# Patient Record
Sex: FEMALE | Race: WHITE | NOT HISPANIC OR LATINO
[De-identification: names, ages, dates, MRNs, and addresses within clinical notes are randomized per-mention and may not be internally consistent; named-entity substitution may affect disease eponyms.]

---

## 2017-06-29 ENCOUNTER — APPOINTMENT (OUTPATIENT)
Dept: ORTHOPEDIC SURGERY | Facility: CLINIC | Age: 72
End: 2017-06-29

## 2017-06-29 VITALS
HEART RATE: 66 BPM | HEIGHT: 65 IN | SYSTOLIC BLOOD PRESSURE: 145 MMHG | WEIGHT: 218 LBS | BODY MASS INDEX: 36.32 KG/M2 | DIASTOLIC BLOOD PRESSURE: 85 MMHG

## 2017-07-03 ENCOUNTER — APPOINTMENT (OUTPATIENT)
Dept: ORTHOPEDIC SURGERY | Facility: CLINIC | Age: 72
End: 2017-07-03

## 2017-07-03 DIAGNOSIS — Z78.9 OTHER SPECIFIED HEALTH STATUS: ICD-10-CM

## 2017-07-03 DIAGNOSIS — Z87.891 PERSONAL HISTORY OF NICOTINE DEPENDENCE: ICD-10-CM

## 2017-07-03 DIAGNOSIS — Z86.39 PERSONAL HISTORY OF OTHER ENDOCRINE, NUTRITIONAL AND METABOLIC DISEASE: ICD-10-CM

## 2017-08-28 ENCOUNTER — OUTPATIENT (OUTPATIENT)
Dept: OUTPATIENT SERVICES | Facility: HOSPITAL | Age: 72
LOS: 1 days | End: 2017-08-28
Payer: COMMERCIAL

## 2017-08-28 VITALS
DIASTOLIC BLOOD PRESSURE: 78 MMHG | RESPIRATION RATE: 16 BRPM | TEMPERATURE: 98 F | WEIGHT: 223.99 LBS | HEIGHT: 65 IN | OXYGEN SATURATION: 96 % | HEART RATE: 67 BPM | SYSTOLIC BLOOD PRESSURE: 123 MMHG

## 2017-08-28 DIAGNOSIS — M19.90 UNSPECIFIED OSTEOARTHRITIS, UNSPECIFIED SITE: ICD-10-CM

## 2017-08-28 DIAGNOSIS — G47.33 OBSTRUCTIVE SLEEP APNEA (ADULT) (PEDIATRIC): ICD-10-CM

## 2017-08-28 DIAGNOSIS — I10 ESSENTIAL (PRIMARY) HYPERTENSION: ICD-10-CM

## 2017-08-28 DIAGNOSIS — M17.12 UNILATERAL PRIMARY OSTEOARTHRITIS, LEFT KNEE: ICD-10-CM

## 2017-08-28 DIAGNOSIS — Z98.890 OTHER SPECIFIED POSTPROCEDURAL STATES: Chronic | ICD-10-CM

## 2017-08-28 DIAGNOSIS — Z90.49 ACQUIRED ABSENCE OF OTHER SPECIFIED PARTS OF DIGESTIVE TRACT: Chronic | ICD-10-CM

## 2017-08-28 DIAGNOSIS — Z01.818 ENCOUNTER FOR OTHER PREPROCEDURAL EXAMINATION: ICD-10-CM

## 2017-08-28 LAB
ANION GAP SERPL CALC-SCNC: 12 MMOL/L — SIGNIFICANT CHANGE UP (ref 5–17)
BLD GP AB SCN SERPL QL: NEGATIVE — SIGNIFICANT CHANGE UP
BUN SERPL-MCNC: 23 MG/DL — SIGNIFICANT CHANGE UP (ref 7–23)
CALCIUM SERPL-MCNC: 10.1 MG/DL — SIGNIFICANT CHANGE UP (ref 8.4–10.5)
CHLORIDE SERPL-SCNC: 105 MMOL/L — SIGNIFICANT CHANGE UP (ref 96–108)
CO2 SERPL-SCNC: 26 MMOL/L — SIGNIFICANT CHANGE UP (ref 22–31)
CREAT SERPL-MCNC: 0.74 MG/DL — SIGNIFICANT CHANGE UP (ref 0.5–1.3)
GLUCOSE SERPL-MCNC: 74 MG/DL — SIGNIFICANT CHANGE UP (ref 70–99)
HCT VFR BLD CALC: 43.2 % — SIGNIFICANT CHANGE UP (ref 34.5–45)
HGB BLD-MCNC: 14 G/DL — SIGNIFICANT CHANGE UP (ref 11.5–15.5)
MCHC RBC-ENTMCNC: 30.9 PG — SIGNIFICANT CHANGE UP (ref 27–34)
MCHC RBC-ENTMCNC: 32.4 GM/DL — SIGNIFICANT CHANGE UP (ref 32–36)
MCV RBC AUTO: 95.4 FL — SIGNIFICANT CHANGE UP (ref 80–100)
MRSA PCR RESULT.: SIGNIFICANT CHANGE UP
PLATELET # BLD AUTO: 323 K/UL — SIGNIFICANT CHANGE UP (ref 150–400)
POTASSIUM SERPL-MCNC: 4.3 MMOL/L — SIGNIFICANT CHANGE UP (ref 3.5–5.3)
POTASSIUM SERPL-SCNC: 4.3 MMOL/L — SIGNIFICANT CHANGE UP (ref 3.5–5.3)
RBC # BLD: 4.53 M/UL — SIGNIFICANT CHANGE UP (ref 3.8–5.2)
RBC # FLD: 13.5 % — SIGNIFICANT CHANGE UP (ref 10.3–14.5)
RH IG SCN BLD-IMP: POSITIVE — SIGNIFICANT CHANGE UP
S AUREUS DNA NOSE QL NAA+PROBE: SIGNIFICANT CHANGE UP
SODIUM SERPL-SCNC: 143 MMOL/L — SIGNIFICANT CHANGE UP (ref 135–145)
WBC # BLD: 7.14 K/UL — SIGNIFICANT CHANGE UP (ref 3.8–10.5)
WBC # FLD AUTO: 7.14 K/UL — SIGNIFICANT CHANGE UP (ref 3.8–10.5)

## 2017-08-28 PROCEDURE — 93010 ELECTROCARDIOGRAM REPORT: CPT

## 2017-08-28 PROCEDURE — 86901 BLOOD TYPING SEROLOGIC RH(D): CPT

## 2017-08-28 PROCEDURE — G0463: CPT

## 2017-08-28 PROCEDURE — 80048 BASIC METABOLIC PNL TOTAL CA: CPT

## 2017-08-28 PROCEDURE — 86850 RBC ANTIBODY SCREEN: CPT

## 2017-08-28 PROCEDURE — 87641 MR-STAPH DNA AMP PROBE: CPT

## 2017-08-28 PROCEDURE — 93005 ELECTROCARDIOGRAM TRACING: CPT

## 2017-08-28 PROCEDURE — 85027 COMPLETE CBC AUTOMATED: CPT

## 2017-08-28 PROCEDURE — 87640 STAPH A DNA AMP PROBE: CPT

## 2017-08-28 PROCEDURE — 86900 BLOOD TYPING SEROLOGIC ABO: CPT

## 2017-08-28 RX ORDER — CEFAZOLIN SODIUM 1 G
2000 VIAL (EA) INJECTION ONCE
Qty: 0 | Refills: 0 | Status: DISCONTINUED | OUTPATIENT
Start: 2017-09-11 | End: 2017-09-13

## 2017-08-28 RX ORDER — GABAPENTIN 400 MG/1
100 CAPSULE ORAL ONCE
Qty: 0 | Refills: 0 | Status: COMPLETED | OUTPATIENT
Start: 2017-09-11 | End: 2017-09-11

## 2017-08-28 RX ORDER — TRAMADOL HYDROCHLORIDE 50 MG/1
50 TABLET ORAL ONCE
Qty: 0 | Refills: 0 | Status: DISCONTINUED | OUTPATIENT
Start: 2017-09-11 | End: 2017-09-11

## 2017-08-28 RX ORDER — PANTOPRAZOLE SODIUM 20 MG/1
40 TABLET, DELAYED RELEASE ORAL ONCE
Qty: 0 | Refills: 0 | Status: COMPLETED | OUTPATIENT
Start: 2017-09-11 | End: 2017-09-11

## 2017-08-28 RX ORDER — ACETAMINOPHEN 500 MG
975 TABLET ORAL ONCE
Qty: 0 | Refills: 0 | Status: COMPLETED | OUTPATIENT
Start: 2017-09-11 | End: 2017-09-11

## 2017-08-28 NOTE — H&P PST ADULT - PMH
HTN (hypertension)    Hyperlipemia    OA (osteoarthritis)  left knee  Obesity  BMI 37.3 on 8/28/2017  WILMER (obstructive sleep apnea)  as per [facility] criteria

## 2017-08-28 NOTE — H&P PST ADULT - PSH
Basal cell carcinoma of face  Mohs surgery  2/23/12  S/P breast biopsy  right, benign  S/P laparoscopic cholecystectomy  2002  Uterus bleeding  D&C

## 2017-08-28 NOTE — H&P PST ADULT - HISTORY OF PRESENT ILLNESS
72 yo female, PMH HTN, HLD, Obesity, reports left knee pain for many years, becoming progressively worse, pt. presents to PST today for Left Total Knee Replacement on 9/11/17. Pt. had a viral cold about 2 weeks ago, denies any further nasal symptoms, no cough, no fever, chills, chest pain, SOB, changes in bowel/urinary habits.

## 2017-08-28 NOTE — H&P PST ADULT - PROBLEM SELECTOR PLAN 1
Left total knee replacement  Pre-op education, including Chlorhexidine soap, provided - all questions answered

## 2017-08-28 NOTE — H&P PST ADULT - NSANTHOSAYNRD_GEN_A_CORE
No. WILMER screening performed.  STOP BANG Legend: 0-2 = LOW Risk; 3-4 = INTERMEDIATE Risk; 5-8 = HIGH Risk

## 2017-09-01 ENCOUNTER — TRANSCRIPTION ENCOUNTER (OUTPATIENT)
Age: 72
End: 2017-09-01

## 2017-09-11 ENCOUNTER — INPATIENT (INPATIENT)
Facility: HOSPITAL | Age: 72
LOS: 1 days | Discharge: HOME | DRG: 470 | End: 2017-09-13
Attending: ORTHOPAEDIC SURGERY | Admitting: ORTHOPAEDIC SURGERY
Payer: COMMERCIAL

## 2017-09-11 ENCOUNTER — TRANSCRIPTION ENCOUNTER (OUTPATIENT)
Age: 72
End: 2017-09-11

## 2017-09-11 ENCOUNTER — APPOINTMENT (OUTPATIENT)
Dept: ORTHOPEDIC SURGERY | Facility: HOSPITAL | Age: 72
End: 2017-09-11

## 2017-09-11 ENCOUNTER — RESULT REVIEW (OUTPATIENT)
Age: 72
End: 2017-09-11

## 2017-09-11 VITALS
HEART RATE: 66 BPM | HEIGHT: 65 IN | DIASTOLIC BLOOD PRESSURE: 86 MMHG | OXYGEN SATURATION: 98 % | RESPIRATION RATE: 20 BRPM | WEIGHT: 223.99 LBS | SYSTOLIC BLOOD PRESSURE: 126 MMHG | TEMPERATURE: 98 F

## 2017-09-11 DIAGNOSIS — Z98.890 OTHER SPECIFIED POSTPROCEDURAL STATES: Chronic | ICD-10-CM

## 2017-09-11 DIAGNOSIS — M17.12 UNILATERAL PRIMARY OSTEOARTHRITIS, LEFT KNEE: ICD-10-CM

## 2017-09-11 DIAGNOSIS — Z90.49 ACQUIRED ABSENCE OF OTHER SPECIFIED PARTS OF DIGESTIVE TRACT: Chronic | ICD-10-CM

## 2017-09-11 LAB
ANION GAP SERPL CALC-SCNC: 13 MMOL/L — SIGNIFICANT CHANGE UP (ref 5–17)
BUN SERPL-MCNC: 16 MG/DL — SIGNIFICANT CHANGE UP (ref 7–23)
CALCIUM SERPL-MCNC: 8.2 MG/DL — LOW (ref 8.4–10.5)
CHLORIDE SERPL-SCNC: 105 MMOL/L — SIGNIFICANT CHANGE UP (ref 96–108)
CO2 SERPL-SCNC: 24 MMOL/L — SIGNIFICANT CHANGE UP (ref 22–31)
CREAT SERPL-MCNC: 0.61 MG/DL — SIGNIFICANT CHANGE UP (ref 0.5–1.3)
GLUCOSE SERPL-MCNC: 129 MG/DL — HIGH (ref 70–99)
HCT VFR BLD CALC: 40.3 % — SIGNIFICANT CHANGE UP (ref 34.5–45)
HGB BLD-MCNC: 13.7 G/DL — SIGNIFICANT CHANGE UP (ref 11.5–15.5)
MCHC RBC-ENTMCNC: 33.5 PG — SIGNIFICANT CHANGE UP (ref 27–34)
MCHC RBC-ENTMCNC: 34.1 GM/DL — SIGNIFICANT CHANGE UP (ref 32–36)
MCV RBC AUTO: 98.3 FL — SIGNIFICANT CHANGE UP (ref 80–100)
PLATELET # BLD AUTO: 251 K/UL — SIGNIFICANT CHANGE UP (ref 150–400)
POTASSIUM SERPL-MCNC: 4.2 MMOL/L — SIGNIFICANT CHANGE UP (ref 3.5–5.3)
POTASSIUM SERPL-SCNC: 4.2 MMOL/L — SIGNIFICANT CHANGE UP (ref 3.5–5.3)
RBC # BLD: 4.1 M/UL — SIGNIFICANT CHANGE UP (ref 3.8–5.2)
RBC # FLD: 12.1 % — SIGNIFICANT CHANGE UP (ref 10.3–14.5)
SODIUM SERPL-SCNC: 142 MMOL/L — SIGNIFICANT CHANGE UP (ref 135–145)
WBC # BLD: 12.4 K/UL — HIGH (ref 3.8–10.5)
WBC # FLD AUTO: 12.4 K/UL — HIGH (ref 3.8–10.5)

## 2017-09-11 PROCEDURE — 88311 DECALCIFY TISSUE: CPT | Mod: 26

## 2017-09-11 PROCEDURE — 73560 X-RAY EXAM OF KNEE 1 OR 2: CPT | Mod: 26,LT

## 2017-09-11 PROCEDURE — 88305 TISSUE EXAM BY PATHOLOGIST: CPT | Mod: 26

## 2017-09-11 PROCEDURE — 27447 TOTAL KNEE ARTHROPLASTY: CPT | Mod: LT

## 2017-09-11 RX ORDER — DOCUSATE SODIUM 100 MG
100 CAPSULE ORAL THREE TIMES A DAY
Qty: 0 | Refills: 0 | Status: DISCONTINUED | OUTPATIENT
Start: 2017-09-11 | End: 2017-09-13

## 2017-09-11 RX ORDER — SODIUM CHLORIDE 9 MG/ML
1000 INJECTION, SOLUTION INTRAVENOUS
Qty: 0 | Refills: 0 | Status: DISCONTINUED | OUTPATIENT
Start: 2017-09-11 | End: 2017-09-12

## 2017-09-11 RX ORDER — PANTOPRAZOLE SODIUM 20 MG/1
40 TABLET, DELAYED RELEASE ORAL DAILY
Qty: 0 | Refills: 0 | Status: DISCONTINUED | OUTPATIENT
Start: 2017-09-11 | End: 2017-09-13

## 2017-09-11 RX ORDER — OXYCODONE HYDROCHLORIDE 5 MG/1
5 TABLET ORAL EVERY 4 HOURS
Qty: 0 | Refills: 0 | Status: DISCONTINUED | OUTPATIENT
Start: 2017-09-11 | End: 2017-09-13

## 2017-09-11 RX ORDER — ACETAMINOPHEN 500 MG
650 TABLET ORAL EVERY 6 HOURS
Qty: 0 | Refills: 0 | Status: DISCONTINUED | OUTPATIENT
Start: 2017-09-11 | End: 2017-09-13

## 2017-09-11 RX ORDER — ONDANSETRON 8 MG/1
4 TABLET, FILM COATED ORAL ONCE
Qty: 0 | Refills: 0 | Status: COMPLETED | OUTPATIENT
Start: 2017-09-11 | End: 2017-09-11

## 2017-09-11 RX ORDER — VALSARTAN 80 MG/1
80 TABLET ORAL DAILY
Qty: 0 | Refills: 0 | Status: DISCONTINUED | OUTPATIENT
Start: 2017-09-11 | End: 2017-09-13

## 2017-09-11 RX ORDER — DIPHENHYDRAMINE HCL 50 MG
25 CAPSULE ORAL AT BEDTIME
Qty: 0 | Refills: 0 | Status: DISCONTINUED | OUTPATIENT
Start: 2017-09-11 | End: 2017-09-13

## 2017-09-11 RX ORDER — ATORVASTATIN CALCIUM 80 MG/1
20 TABLET, FILM COATED ORAL AT BEDTIME
Qty: 0 | Refills: 0 | Status: DISCONTINUED | OUTPATIENT
Start: 2017-09-11 | End: 2017-09-13

## 2017-09-11 RX ORDER — TRAMADOL HYDROCHLORIDE 50 MG/1
50 TABLET ORAL EVERY 8 HOURS
Qty: 0 | Refills: 0 | Status: DISCONTINUED | OUTPATIENT
Start: 2017-09-11 | End: 2017-09-13

## 2017-09-11 RX ORDER — OXYCODONE HYDROCHLORIDE 5 MG/1
10 TABLET ORAL EVERY 4 HOURS
Qty: 0 | Refills: 0 | Status: DISCONTINUED | OUTPATIENT
Start: 2017-09-11 | End: 2017-09-13

## 2017-09-11 RX ORDER — ONDANSETRON 8 MG/1
4 TABLET, FILM COATED ORAL EVERY 6 HOURS
Qty: 0 | Refills: 0 | Status: DISCONTINUED | OUTPATIENT
Start: 2017-09-11 | End: 2017-09-13

## 2017-09-11 RX ORDER — SENNA PLUS 8.6 MG/1
2 TABLET ORAL AT BEDTIME
Qty: 0 | Refills: 0 | Status: DISCONTINUED | OUTPATIENT
Start: 2017-09-11 | End: 2017-09-13

## 2017-09-11 RX ORDER — HYDROMORPHONE HYDROCHLORIDE 2 MG/ML
0.25 INJECTION INTRAMUSCULAR; INTRAVENOUS; SUBCUTANEOUS
Qty: 0 | Refills: 0 | Status: DISCONTINUED | OUTPATIENT
Start: 2017-09-11 | End: 2017-09-11

## 2017-09-11 RX ORDER — SODIUM CHLORIDE 9 MG/ML
3 INJECTION INTRAMUSCULAR; INTRAVENOUS; SUBCUTANEOUS EVERY 8 HOURS
Qty: 0 | Refills: 0 | Status: DISCONTINUED | OUTPATIENT
Start: 2017-09-11 | End: 2017-09-11

## 2017-09-11 RX ORDER — ASPIRIN/CALCIUM CARB/MAGNESIUM 324 MG
325 TABLET ORAL
Qty: 0 | Refills: 0 | Status: DISCONTINUED | OUTPATIENT
Start: 2017-09-11 | End: 2017-09-13

## 2017-09-11 RX ORDER — POLYETHYLENE GLYCOL 3350 17 G/17G
17 POWDER, FOR SOLUTION ORAL DAILY
Qty: 0 | Refills: 0 | Status: DISCONTINUED | OUTPATIENT
Start: 2017-09-11 | End: 2017-09-13

## 2017-09-11 RX ORDER — ATORVASTATIN CALCIUM 80 MG/1
1 TABLET, FILM COATED ORAL
Qty: 0 | Refills: 0 | COMMUNITY

## 2017-09-11 RX ORDER — CEFAZOLIN SODIUM 1 G
2000 VIAL (EA) INJECTION EVERY 8 HOURS
Qty: 0 | Refills: 0 | Status: COMPLETED | OUTPATIENT
Start: 2017-09-11 | End: 2017-09-12

## 2017-09-11 RX ORDER — VALSARTAN 80 MG/1
1 TABLET ORAL
Qty: 0 | Refills: 0 | COMMUNITY

## 2017-09-11 RX ORDER — MAGNESIUM HYDROXIDE 400 MG/1
30 TABLET, CHEWABLE ORAL DAILY
Qty: 0 | Refills: 0 | Status: DISCONTINUED | OUTPATIENT
Start: 2017-09-11 | End: 2017-09-13

## 2017-09-11 RX ADMIN — Medication 975 MILLIGRAM(S): at 09:32

## 2017-09-11 RX ADMIN — SODIUM CHLORIDE 3 MILLILITER(S): 9 INJECTION INTRAMUSCULAR; INTRAVENOUS; SUBCUTANEOUS at 09:38

## 2017-09-11 RX ADMIN — TRAMADOL HYDROCHLORIDE 50 MILLIGRAM(S): 50 TABLET ORAL at 21:20

## 2017-09-11 RX ADMIN — GABAPENTIN 100 MILLIGRAM(S): 400 CAPSULE ORAL at 09:31

## 2017-09-11 RX ADMIN — Medication 100 MILLIGRAM(S): at 18:39

## 2017-09-11 RX ADMIN — ONDANSETRON 4 MILLIGRAM(S): 8 TABLET, FILM COATED ORAL at 14:39

## 2017-09-11 RX ADMIN — ONDANSETRON 4 MILLIGRAM(S): 8 TABLET, FILM COATED ORAL at 18:39

## 2017-09-11 RX ADMIN — TRAMADOL HYDROCHLORIDE 50 MILLIGRAM(S): 50 TABLET ORAL at 09:31

## 2017-09-11 RX ADMIN — Medication 325 MILLIGRAM(S): at 17:27

## 2017-09-11 RX ADMIN — SODIUM CHLORIDE 75 MILLILITER(S): 9 INJECTION, SOLUTION INTRAVENOUS at 16:56

## 2017-09-11 RX ADMIN — ATORVASTATIN CALCIUM 20 MILLIGRAM(S): 80 TABLET, FILM COATED ORAL at 21:20

## 2017-09-11 RX ADMIN — TRAMADOL HYDROCHLORIDE 50 MILLIGRAM(S): 50 TABLET ORAL at 22:22

## 2017-09-11 RX ADMIN — Medication 100 MILLIGRAM(S): at 21:20

## 2017-09-11 RX ADMIN — PANTOPRAZOLE SODIUM 40 MILLIGRAM(S): 20 TABLET, DELAYED RELEASE ORAL at 09:32

## 2017-09-11 RX ADMIN — VALSARTAN 80 MILLIGRAM(S): 80 TABLET ORAL at 21:20

## 2017-09-11 NOTE — PATIENT PROFILE ADULT. - PMH
HTN (hypertension)    Hyperlipemia    OA (osteoarthritis)  left knee  Obesity  BMI 37.3 on 8/28/2017  WILMER (obstructive sleep apnea)  as per [facility] criteria

## 2017-09-11 NOTE — PHYSICAL THERAPY INITIAL EVALUATION ADULT - ADDITIONAL COMMENTS
Pt lives in private house with  and son. Has 4 steps to enter bilateral rails but width allows only one rail at a time. Has full flight to upstairs bedroom, but can stay on first floor if needed.

## 2017-09-11 NOTE — PHYSICAL THERAPY INITIAL EVALUATION ADULT - ACTIVE RANGE OF MOTION EXAMINATION, REHAB EVAL
Right LE Active ROM was WFL (within functional limits)/bilateral upper extremity Active ROM was WFL (within functional limits)/left knee limited s/p TKR

## 2017-09-11 NOTE — CHART NOTE - NSCHARTNOTEFT_GEN_A_CORE
Patient is a 72y old  Female who presents with a chief complaint of "left knee replacement" (11 Sep 2017 09:48)      Resting without complaints.  No Chest Pain, SOB, N/V.    T(C): 36 (09-11-17 @ 14:30), Max: 36.8 (09-11-17 @ 08:50)  HR: 69 (09-11-17 @ 15:00) (65 - 72)  BP: 143/75 (09-11-17 @ 14:45) (126/86 - 170/78)  RR: 20 (09-11-17 @ 15:00) (15 - 28)  SpO2: 98% (09-11-17 @ 15:00) (97% - 100%)  Wt(kg): --    Exam:   Alert and Oriented; No Acute Distress  Card: +S1/S2, RRR  Pulm: CTAB  LLE: Aquacell dressing C/D/I. Sensation grossly intact to light touch.  Calves soft, non-tender bilaterally  (+) PF/DF  (+) Distal pulses    Xray: Good alignment of hardware                          13.7   12.4  )-----------( 251      ( 11 Sep 2017 13:59 )             40.3    09-11    142  |  105  |  16  ----------------------------<  129<H>  4.2   |  24  |  0.61    Ca    8.2<L>      11 Sep 2017 13:59          Patient is a 72y old  Female who presents with a chief complaint of "left knee replacement" (11 Sep 2017 09:48)   s/p Left TKR. Pt stable.    Plan:  - Pain Control   - DVT ppx  - WBAT/OOB  - Venodynes/IS  - Continue current tx    Kike Mckeon PA-C  Orthopedic Surgery  [id number]  [id number]

## 2017-09-11 NOTE — PHYSICAL THERAPY INITIAL EVALUATION ADULT - GENERAL OBSERVATIONS, REHAB EVAL
Pt seen for PT eval s/p TKR. Pt A&Ox4, +IVL, +ACE wrap, tolerated 40 min. Pt initally nauseous, +vomiting, pt then felt much better and agreeable/eager for OOB activity. Pt w/ fair quad set and no lag noted with Active assisted SLR. Orthostatics monitored t/o session (see flowsheet for details).

## 2017-09-11 NOTE — PHYSICAL THERAPY INITIAL EVALUATION ADULT - PERTINENT HX OF CURRENT PROBLEM, REHAB EVAL
72 yo female, PMH HTN, HLD, Obesity, reports left knee pain for many years, becoming progressively worse, pt. presents to PST today for Left Total Knee Replacement on 9/11/17. Pt. had a viral cold about 2 weeks ago, denies any further nasal symptoms, no cough, no fever, chills, chest pain, SOB, changes in bowel/urinary habits. Underwent Left TKR as scheduled on 9/11/17.

## 2017-09-11 NOTE — PHYSICAL THERAPY INITIAL EVALUATION ADULT - DISCHARGE DISPOSITION, PT EVAL
home w/ home PT/family assist as needed; Home with home PT services for safety assessment within home environment and to increase strength, balance, and endurance to improve all functional mobility./home w/ assist

## 2017-09-11 NOTE — PHYSICAL THERAPY INITIAL EVALUATION ADULT - MANUAL MUSCLE TESTING RESULTS, REHAB EVAL
grossly 4+/5 bilateral UE; RLE 5/5; Left hip and ankle 5/5; left knee fair+quad set, no lag noted with Active assist SLR

## 2017-09-11 NOTE — PHYSICAL THERAPY INITIAL EVALUATION ADULT - DIAGNOSIS, PT EVAL
Pt s/p TKR with post-op pain, decreased strength, ROM endurance and balance leading to impairment in functional mobility.

## 2017-09-12 LAB
ANION GAP SERPL CALC-SCNC: 14 MMOL/L — SIGNIFICANT CHANGE UP (ref 5–17)
BUN SERPL-MCNC: 11 MG/DL — SIGNIFICANT CHANGE UP (ref 7–23)
CALCIUM SERPL-MCNC: 9.3 MG/DL — SIGNIFICANT CHANGE UP (ref 8.4–10.5)
CHLORIDE SERPL-SCNC: 101 MMOL/L — SIGNIFICANT CHANGE UP (ref 96–108)
CO2 SERPL-SCNC: 23 MMOL/L — SIGNIFICANT CHANGE UP (ref 22–31)
CREAT SERPL-MCNC: 0.49 MG/DL — LOW (ref 0.5–1.3)
GLUCOSE SERPL-MCNC: 133 MG/DL — HIGH (ref 70–99)
HCT VFR BLD CALC: 36.3 % — SIGNIFICANT CHANGE UP (ref 34.5–45)
HGB BLD-MCNC: 11.8 G/DL — SIGNIFICANT CHANGE UP (ref 11.5–15.5)
MCHC RBC-ENTMCNC: 30.4 PG — SIGNIFICANT CHANGE UP (ref 27–34)
MCHC RBC-ENTMCNC: 32.5 GM/DL — SIGNIFICANT CHANGE UP (ref 32–36)
MCV RBC AUTO: 93.6 FL — SIGNIFICANT CHANGE UP (ref 80–100)
PLATELET # BLD AUTO: 238 K/UL — SIGNIFICANT CHANGE UP (ref 150–400)
POTASSIUM SERPL-MCNC: 4.2 MMOL/L — SIGNIFICANT CHANGE UP (ref 3.5–5.3)
POTASSIUM SERPL-SCNC: 4.2 MMOL/L — SIGNIFICANT CHANGE UP (ref 3.5–5.3)
RBC # BLD: 3.88 M/UL — SIGNIFICANT CHANGE UP (ref 3.8–5.2)
RBC # FLD: 13.6 % — SIGNIFICANT CHANGE UP (ref 10.3–14.5)
SODIUM SERPL-SCNC: 138 MMOL/L — SIGNIFICANT CHANGE UP (ref 135–145)
WBC # BLD: 10.57 K/UL — HIGH (ref 3.8–10.5)
WBC # FLD AUTO: 10.57 K/UL — HIGH (ref 3.8–10.5)

## 2017-09-12 RX ORDER — SODIUM CHLORIDE 9 MG/ML
1000 INJECTION, SOLUTION INTRAVENOUS ONCE
Qty: 0 | Refills: 0 | Status: COMPLETED | OUTPATIENT
Start: 2017-09-12 | End: 2017-09-12

## 2017-09-12 RX ORDER — SODIUM CHLORIDE 9 MG/ML
1000 INJECTION, SOLUTION INTRAVENOUS
Qty: 0 | Refills: 0 | Status: DISCONTINUED | OUTPATIENT
Start: 2017-09-12 | End: 2017-09-13

## 2017-09-12 RX ORDER — METOCLOPRAMIDE HCL 10 MG
10 TABLET ORAL EVERY 8 HOURS
Qty: 0 | Refills: 0 | Status: DISCONTINUED | OUTPATIENT
Start: 2017-09-12 | End: 2017-09-13

## 2017-09-12 RX ADMIN — Medication 100 MILLIGRAM(S): at 05:33

## 2017-09-12 RX ADMIN — Medication 325 MILLIGRAM(S): at 05:31

## 2017-09-12 RX ADMIN — Medication 100 MILLIGRAM(S): at 15:11

## 2017-09-12 RX ADMIN — TRAMADOL HYDROCHLORIDE 50 MILLIGRAM(S): 50 TABLET ORAL at 07:19

## 2017-09-12 RX ADMIN — ONDANSETRON 4 MILLIGRAM(S): 8 TABLET, FILM COATED ORAL at 10:34

## 2017-09-12 RX ADMIN — OXYCODONE HYDROCHLORIDE 10 MILLIGRAM(S): 5 TABLET ORAL at 21:09

## 2017-09-12 RX ADMIN — VALSARTAN 80 MILLIGRAM(S): 80 TABLET ORAL at 21:10

## 2017-09-12 RX ADMIN — OXYCODONE HYDROCHLORIDE 10 MILLIGRAM(S): 5 TABLET ORAL at 16:49

## 2017-09-12 RX ADMIN — OXYCODONE HYDROCHLORIDE 10 MILLIGRAM(S): 5 TABLET ORAL at 09:27

## 2017-09-12 RX ADMIN — TRAMADOL HYDROCHLORIDE 50 MILLIGRAM(S): 50 TABLET ORAL at 23:13

## 2017-09-12 RX ADMIN — TRAMADOL HYDROCHLORIDE 50 MILLIGRAM(S): 50 TABLET ORAL at 15:11

## 2017-09-12 RX ADMIN — SODIUM CHLORIDE 1000 MILLILITER(S): 9 INJECTION, SOLUTION INTRAVENOUS at 11:55

## 2017-09-12 RX ADMIN — Medication 100 MILLIGRAM(S): at 21:10

## 2017-09-12 RX ADMIN — ATORVASTATIN CALCIUM 20 MILLIGRAM(S): 80 TABLET, FILM COATED ORAL at 21:10

## 2017-09-12 RX ADMIN — Medication 100 MILLIGRAM(S): at 05:31

## 2017-09-12 RX ADMIN — OXYCODONE HYDROCHLORIDE 10 MILLIGRAM(S): 5 TABLET ORAL at 08:57

## 2017-09-12 RX ADMIN — Medication 325 MILLIGRAM(S): at 18:10

## 2017-09-12 RX ADMIN — OXYCODONE HYDROCHLORIDE 10 MILLIGRAM(S): 5 TABLET ORAL at 21:39

## 2017-09-12 RX ADMIN — POLYETHYLENE GLYCOL 3350 17 GRAM(S): 17 POWDER, FOR SOLUTION ORAL at 11:56

## 2017-09-12 RX ADMIN — TRAMADOL HYDROCHLORIDE 50 MILLIGRAM(S): 50 TABLET ORAL at 05:31

## 2017-09-12 RX ADMIN — OXYCODONE HYDROCHLORIDE 10 MILLIGRAM(S): 5 TABLET ORAL at 17:19

## 2017-09-12 RX ADMIN — TRAMADOL HYDROCHLORIDE 50 MILLIGRAM(S): 50 TABLET ORAL at 22:43

## 2017-09-12 RX ADMIN — PANTOPRAZOLE SODIUM 40 MILLIGRAM(S): 20 TABLET, DELAYED RELEASE ORAL at 11:56

## 2017-09-12 RX ADMIN — SODIUM CHLORIDE 100 MILLILITER(S): 9 INJECTION, SOLUTION INTRAVENOUS at 15:11

## 2017-09-12 RX ADMIN — TRAMADOL HYDROCHLORIDE 50 MILLIGRAM(S): 50 TABLET ORAL at 15:41

## 2017-09-12 NOTE — OCCUPATIONAL THERAPY INITIAL EVALUATION ADULT - LIVES WITH, PROFILE
Pt lives with her  in a private home, 4 steps to enter with handrail. Pt will be staying on the 1st floor upon discharge with a walk in shower with small ledge (pt reports 4-6 inches) with grab bars. Pt was independent with ADLs, IADLs, functional mobility, driving prior to admission./spouse

## 2017-09-12 NOTE — OCCUPATIONAL THERAPY INITIAL EVALUATION ADULT - ANTICIPATED DISCHARGE DISPOSITION, OT EVAL
Anticipate home with home OT pending completion of functional evaluation. Supervision/assist for functional activities as needed./home w/ OT

## 2017-09-12 NOTE — OCCUPATIONAL THERAPY INITIAL EVALUATION ADULT - ADDITIONAL COMMENTS
X-Ray L Knee 9/11/17: Status post left total knee replacement without evidence of acute hardware complication.

## 2017-09-12 NOTE — PROGRESS NOTE ADULT - SUBJECTIVE AND OBJECTIVE BOX
Patient is a 72y old  Female who presents with a chief complaint of "left knee replacement" (11 Sep 2017 09:48)      POST OPERATIVE DAY #:  [1 ]   Patient comfortable. No events overnight  No complaints    T(C): 36.6 (09-12-17 @ 04:46), Max: 36.8 (09-11-17 @ 08:50)  HR: 71 (09-12-17 @ 04:46) (65 - 75)  BP: 107/58 (09-12-17 @ 04:46) (107/58 - 170/78)  RR: 18 (09-12-17 @ 04:46) (15 - 41)  SpO2: 96% (09-12-17 @ 04:46) (95% - 100%)        PHYSICAL EXAM:  NAD, Alert  Chest: CTA No W/R/R  Cor: reg S1 S2  ABD: quiet B/S; N/D  N/T  EXT: LLE  Dc'd Ace Dsg  [x ] Aquacel Dressing C/D/I  Calves soft Homans (-)  (+) DF/PF;  EHL FHL: 5/5  No gross Sensation deficits noted   (2+) Distal Pulses;   B/L, PAS     LABS:                        13.7   12.4<H> )-----------( 251      ( 11 Sep 2017 13:59 )             40.3     09-11    142  |  105  |  16  ----------------------------<  129<H>  4.2   |  24  |  0.61        RADIOLOGY & ADDITIONAL TESTS:    IMPRESSION:  Status post left total knee replacement without evidence of acute   hardware complication.

## 2017-09-12 NOTE — OCCUPATIONAL THERAPY INITIAL EVALUATION ADULT - DIAGNOSIS, OT EVAL
Pt with impaired ROM, strength, balance impacting pt's ability to complete ADLs, IADLS, functional mobility, drive.

## 2017-09-12 NOTE — PROGRESS NOTE ADULT - ASSESSMENT
Assessment: s/p L TKA  POD #1    Plan:   ck labs: cbc / sma-7  PT: WBAT  DVT proph: Ecotrin 325 mg PO BID / PAS  Pain Rx:  Oxycodone 5/10 mg Q 4 hrs prn

## 2017-09-12 NOTE — OCCUPATIONAL THERAPY INITIAL EVALUATION ADULT - PLANNED THERAPY INTERVENTIONS, OT EVAL
balance training/ROM/IADL retraining/ADL retraining/bed mobility training/strengthening/transfer training

## 2017-09-13 ENCOUNTER — TRANSCRIPTION ENCOUNTER (OUTPATIENT)
Age: 72
End: 2017-09-13

## 2017-09-13 VITALS
SYSTOLIC BLOOD PRESSURE: 118 MMHG | DIASTOLIC BLOOD PRESSURE: 62 MMHG | TEMPERATURE: 98 F | OXYGEN SATURATION: 97 % | HEART RATE: 78 BPM | RESPIRATION RATE: 18 BRPM

## 2017-09-13 LAB
ANION GAP SERPL CALC-SCNC: 14 MMOL/L — SIGNIFICANT CHANGE UP (ref 5–17)
BUN SERPL-MCNC: 13 MG/DL — SIGNIFICANT CHANGE UP (ref 7–23)
CALCIUM SERPL-MCNC: 9 MG/DL — SIGNIFICANT CHANGE UP (ref 8.4–10.5)
CHLORIDE SERPL-SCNC: 100 MMOL/L — SIGNIFICANT CHANGE UP (ref 96–108)
CO2 SERPL-SCNC: 24 MMOL/L — SIGNIFICANT CHANGE UP (ref 22–31)
CREAT SERPL-MCNC: 0.6 MG/DL — SIGNIFICANT CHANGE UP (ref 0.5–1.3)
GLUCOSE SERPL-MCNC: 105 MG/DL — HIGH (ref 70–99)
HCT VFR BLD CALC: 31.8 % — LOW (ref 34.5–45)
HGB BLD-MCNC: 10.3 G/DL — LOW (ref 11.5–15.5)
MCHC RBC-ENTMCNC: 30.1 PG — SIGNIFICANT CHANGE UP (ref 27–34)
MCHC RBC-ENTMCNC: 32.4 GM/DL — SIGNIFICANT CHANGE UP (ref 32–36)
MCV RBC AUTO: 93 FL — SIGNIFICANT CHANGE UP (ref 80–100)
PLATELET # BLD AUTO: 224 K/UL — SIGNIFICANT CHANGE UP (ref 150–400)
POTASSIUM SERPL-MCNC: 4.2 MMOL/L — SIGNIFICANT CHANGE UP (ref 3.5–5.3)
POTASSIUM SERPL-SCNC: 4.2 MMOL/L — SIGNIFICANT CHANGE UP (ref 3.5–5.3)
RBC # BLD: 3.42 M/UL — LOW (ref 3.8–5.2)
RBC # FLD: 13.8 % — SIGNIFICANT CHANGE UP (ref 10.3–14.5)
SODIUM SERPL-SCNC: 138 MMOL/L — SIGNIFICANT CHANGE UP (ref 135–145)
WBC # BLD: 10.42 K/UL — SIGNIFICANT CHANGE UP (ref 3.8–10.5)
WBC # FLD AUTO: 10.42 K/UL — SIGNIFICANT CHANGE UP (ref 3.8–10.5)

## 2017-09-13 PROCEDURE — 86900 BLOOD TYPING SEROLOGIC ABO: CPT

## 2017-09-13 PROCEDURE — C1776: CPT

## 2017-09-13 PROCEDURE — 97530 THERAPEUTIC ACTIVITIES: CPT

## 2017-09-13 PROCEDURE — 97116 GAIT TRAINING THERAPY: CPT

## 2017-09-13 PROCEDURE — 80048 BASIC METABOLIC PNL TOTAL CA: CPT

## 2017-09-13 PROCEDURE — 97161 PT EVAL LOW COMPLEX 20 MIN: CPT

## 2017-09-13 PROCEDURE — 85027 COMPLETE CBC AUTOMATED: CPT

## 2017-09-13 PROCEDURE — C1713: CPT

## 2017-09-13 PROCEDURE — 88311 DECALCIFY TISSUE: CPT

## 2017-09-13 PROCEDURE — 97165 OT EVAL LOW COMPLEX 30 MIN: CPT

## 2017-09-13 PROCEDURE — 73560 X-RAY EXAM OF KNEE 1 OR 2: CPT

## 2017-09-13 PROCEDURE — 88305 TISSUE EXAM BY PATHOLOGIST: CPT

## 2017-09-13 PROCEDURE — 86901 BLOOD TYPING SEROLOGIC RH(D): CPT

## 2017-09-13 RX ORDER — ACETAMINOPHEN 500 MG
2 TABLET ORAL
Qty: 0 | Refills: 0 | COMMUNITY
Start: 2017-09-13

## 2017-09-13 RX ORDER — ASPIRIN/CALCIUM CARB/MAGNESIUM 324 MG
1 TABLET ORAL
Qty: 80 | Refills: 0 | OUTPATIENT
Start: 2017-09-13 | End: 2017-10-23

## 2017-09-13 RX ORDER — OXYCODONE HYDROCHLORIDE 5 MG/1
1 TABLET ORAL
Qty: 42 | Refills: 0 | OUTPATIENT
Start: 2017-09-13 | End: 2017-09-20

## 2017-09-13 RX ADMIN — Medication 100 MILLIGRAM(S): at 05:44

## 2017-09-13 RX ADMIN — OXYCODONE HYDROCHLORIDE 10 MILLIGRAM(S): 5 TABLET ORAL at 10:30

## 2017-09-13 RX ADMIN — ONDANSETRON 4 MILLIGRAM(S): 8 TABLET, FILM COATED ORAL at 15:36

## 2017-09-13 RX ADMIN — OXYCODONE HYDROCHLORIDE 10 MILLIGRAM(S): 5 TABLET ORAL at 03:05

## 2017-09-13 RX ADMIN — ONDANSETRON 4 MILLIGRAM(S): 8 TABLET, FILM COATED ORAL at 08:02

## 2017-09-13 RX ADMIN — Medication 325 MILLIGRAM(S): at 05:44

## 2017-09-13 RX ADMIN — TRAMADOL HYDROCHLORIDE 50 MILLIGRAM(S): 50 TABLET ORAL at 05:44

## 2017-09-13 RX ADMIN — OXYCODONE HYDROCHLORIDE 10 MILLIGRAM(S): 5 TABLET ORAL at 02:35

## 2017-09-13 RX ADMIN — TRAMADOL HYDROCHLORIDE 50 MILLIGRAM(S): 50 TABLET ORAL at 13:35

## 2017-09-13 RX ADMIN — TRAMADOL HYDROCHLORIDE 50 MILLIGRAM(S): 50 TABLET ORAL at 06:14

## 2017-09-13 RX ADMIN — TRAMADOL HYDROCHLORIDE 50 MILLIGRAM(S): 50 TABLET ORAL at 13:09

## 2017-09-13 RX ADMIN — PANTOPRAZOLE SODIUM 40 MILLIGRAM(S): 20 TABLET, DELAYED RELEASE ORAL at 12:03

## 2017-09-13 RX ADMIN — MAGNESIUM HYDROXIDE 30 MILLILITER(S): 400 TABLET, CHEWABLE ORAL at 05:48

## 2017-09-13 RX ADMIN — OXYCODONE HYDROCHLORIDE 10 MILLIGRAM(S): 5 TABLET ORAL at 10:02

## 2017-09-13 RX ADMIN — Medication 100 MILLIGRAM(S): at 13:06

## 2017-09-13 RX ADMIN — POLYETHYLENE GLYCOL 3350 17 GRAM(S): 17 POWDER, FOR SOLUTION ORAL at 12:03

## 2017-09-13 NOTE — DISCHARGE NOTE ADULT - PATIENT PORTAL LINK FT
“You can access the [facility] Patient Portal, offered by [facility], by registering with the following website: [email or web address]”

## 2017-09-13 NOTE — DISCHARGE NOTE ADULT - HOSPITAL COURSE
Chief Complaint/Reason for Admission	"left knee replacement"	     History of Present Illness:  History of Present Illness		  70 yo female, PMH HTN, HLD, Obesity, reports left knee pain for many years, becoming progressively worse, pt. presents to PST today for Left Total Knee Replacement on 9/11/17. Pt. had a viral cold about 2 weeks ago, denies any further nasal symptoms, no cough, no fever, chills, chest pain, SOB, changes in bowel/urinary habits.    Allergies/Medications:   Allergies:        Allergies:  	No Known Allergies:     Home Medications:   * Patient Currently Takes Medications as of 28-Aug-2017 08:07 documented in Prescription Writer  · 	atorvastatin 20 mg oral tablet: Last Dose Taken:  , 1 tab(s) orally once a day (at bedtime)  · 	valsartan 80 mg oral tablet: Last Dose Taken:  , 1 tab(s) orally once a day (at bedtime)    PMH/PSH/FH/SH:    Past Medical History:  HTN (hypertension)    Hyperlipemia    OA (osteoarthritis)  left knee  Obesity  BMI 37.3 on 8/28/2017  WILMER (obstructive sleep apnea)  as per [facility] criteria.     Past Surgical History:  Basal cell carcinoma of face  Mohs surgery  2/23/12  S/P breast biopsy  right, benign  S/P laparoscopic cholecystectomy  2002  Uterus bleeding  D&C. Chief Complaint/Reason for Admission	"left knee replacement"	     History of Present Illness:  History of Present Illness		  72 yo female, PMH HTN, HLD, Obesity, reports left knee pain for many years, becoming progressively worse, pt. presents to PST today for Left Total Knee Replacement on 9/11/17. Pt. had a viral cold about 2 weeks ago, denies any further nasal symptoms, no cough, no fever, chills, chest pain, SOB, changes in bowel/urinary habits.    Allergies/Medications:   Allergies:        Allergies:  	No Known Allergies:     Home Medications:   * Patient Currently Takes Medications as of 28-Aug-2017 08:07 documented in Prescription Writer  · 	atorvastatin 20 mg oral tablet: Last Dose Taken:  , 1 tab(s) orally once a day (at bedtime)  · 	valsartan 80 mg oral tablet: Last Dose Taken:  , 1 tab(s) orally once a day (at bedtime)    PMH/PSH/FH/SH:    Past Medical History:  HTN (hypertension)    Hyperlipemia    OA (osteoarthritis)  left knee  Obesity  BMI 37.3 on 8/28/2017  WILMER (obstructive sleep apnea)  as per [facility] criteria.     Past Surgical History:  Basal cell carcinoma of face  Mohs surgery  2/23/12  S/P breast biopsy  right, benign  S/P laparoscopic cholecystectomy  2002  Uterus bleeding  D&C.    Hospital Course:  73 y/o fm underwent Left total knee arthroplasty on 9/11/17 with Dr.S. Glynn.  Patient tolerated procedure well.  Patient was evaluated postoperatively by physical therapist for weight bearing as tolerated ambulation and cleared patient for discharge home.  Patient advised to keep surgical incision/dressing clean and dry, and follow up with Dr Glynn 12-14 days post op for wound check and suture removal.

## 2017-09-13 NOTE — DISCHARGE NOTE ADULT - CARE PROVIDER_API CALL
Paul Glynn), Orthopaedic Surgery  [address] [place] [address]  Phone: [phone]  Fax: [phone]

## 2017-09-13 NOTE — DISCHARGE NOTE ADULT - CARE PLAN
Principal Discharge DX:	OA (osteoarthritis)  Goal:	surgical intervention should result in improved function  Instructions for follow-up, activity and diet:	continue weight bearing as tolerated physical therapy, DASH diet, and follow up with Dr Glynn post op day #14 (9/25/17)

## 2017-09-13 NOTE — DISCHARGE NOTE ADULT - HOME CARE AGENCY
[facility]   RN visit day after discharge to evaluate for home Physical Therapy

## 2017-09-13 NOTE — DISCHARGE NOTE ADULT - NS AS ACTIVITY OBS
Walking-Indoors allowed/No Heavy lifting/straining/Do not make important decisions/Do not drive or operate machinery/Walking-Outdoors allowed/continue weight bearing as tolerated physical therapy/Stairs allowed

## 2017-09-13 NOTE — DISCHARGE NOTE ADULT - PLAN OF CARE
surgical intervention should result in improved function continue weight bearing as tolerated physical therapy, DASH diet, and follow up with Dr Glynn post op day #14 (9/25/17)

## 2017-09-13 NOTE — DISCHARGE NOTE ADULT - MEDICATION SUMMARY - MEDICATIONS TO TAKE
I will START or STAY ON the medications listed below when I get home from the hospital:    acetaminophen 325 mg oral tablet  -- 2 tab(s) by mouth every 6 hours, As needed, For Temp greater than 38 C (100.4 F)  -- Indication: For fever, H/A    acetaminophen 325 mg oral tablet  -- 2 tab(s) by mouth every 6 hours, As needed, Mild Pain (1 - 3)  -- Indication: For mild pain    oxyCODONE 5 mg oral tablet  -- 1 tab(s) by mouth every 4 hours, As Needed -Moderate Pain (4 - 6) - for moderate pain MDD:6   -- Indication: For moderate pain    aspirin 325 mg oral delayed release tablet  -- 1 tab(s) by mouth 2 times a day for 6 weeks post op MDD:2  -- Indication: For antiplatelet therapy    valsartan 80 mg oral tablet  -- 1 tab(s) by mouth once a day (at bedtime)  -- Indication: For cardiovascular agent    atorvastatin 20 mg oral tablet  -- 1 tab(s) by mouth once a day (at bedtime)  -- Indication: For antihyperlipidemic agent

## 2017-09-13 NOTE — DISCHARGE NOTE ADULT - ADDITIONAL INSTRUCTIONS
Keep incision/Aquacel dressing clean and dry, continue weight bearing as tolerated physical therapy. Follow up with Dr Glynn post op day #14 (9/25/17) for wound check and surgical staple removal.

## 2017-09-13 NOTE — PROGRESS NOTE ADULT - ATTENDING COMMENTS
Patient is a 72y old  Female who presents with a chief complaint of "left knee replacement" (13 Sep 2017 07:13)        Patient comfortable:    No complaints    I agree with the resident or Physician's Assistant current note.    PHYSICAL EXAM:    Alert, NAD    Knee: Dressing C/D/I; sensation grossly intact to touch; (+) DF/PF; (+) Distal Pulses; No Calf tenderness B/L         Physical therapy. Patient is ambulating, progressing with ROM, sitting comfortably.       Plan for Disposition:  home    Paul Glynn MD  [place] [facility]  [phone]
Patient is a 72y old  Female who presents with a chief complaint of "left knee replacement" (11 Sep 2017 09:48)        Patient comfortable:    No complaints    I agree with the resident or Physician's Assistant current note.    PHYSICAL EXAM:    Alert, NAD    Knee: Dressing C/D/I; sensation grossly intact to touch; (+) DF/PF; (+) Distal Pulses; No Calf tenderness B/L     Plan for physical therapy to get out of bed, ambulate full weight bearing as tolerated. work on knee ROM          Plan for Disposition:    Paul Glynn MD  [place] [facility]  [phone]

## 2017-09-13 NOTE — PROGRESS NOTE ADULT - SUBJECTIVE AND OBJECTIVE BOX
Patient seen and examined. Pain controlled. No acute events overnight      MEDICATIONS  (STANDING):  ceFAZolin   IVPB 2000 milliGRAM(s) IV Intermittent once  atorvastatin 20 milliGRAM(s) Oral at bedtime  aspirin enteric coated 325 milliGRAM(s) Oral two times a day  traMADol 50 milliGRAM(s) Oral every 8 hours  pantoprazole    Tablet 40 milliGRAM(s) Oral daily  polyethylene glycol 3350 17 Gram(s) Oral daily  docusate sodium 100 milliGRAM(s) Oral three times a day  valsartan 80 milliGRAM(s) Oral daily  lactated ringers. 1000 milliLiter(s) IV Continuous <Continuous>    Allergies    No Known Allergies    Intolerances                            11.8   10.57 )-----------( 238      ( 12 Sep 2017 08:39 )             36.3     12 Sep 2017 08:41    138    |  101    |  11     ----------------------------<  133    4.2     |  23     |  0.49     Ca    9.3        12 Sep 2017 08:41        Vital Signs Last 24 Hrs  T(C): 36.6 (09-13-17 @ 04:17), Max: 37.1 (09-12-17 @ 20:50)  T(F): 97.8 (09-13-17 @ 04:17), Max: 98.7 (09-12-17 @ 20:50)  HR: 74 (09-13-17 @ 04:17) (61 - 96)  BP: 104/69 (09-13-17 @ 04:17) (102/61 - 122/78)  BP(mean): --  RR: 18 (09-13-17 @ 04:17) (18 - 18)  SpO2: 97% (09-13-17 @ 04:17) (95% - 98%)    Physical Exam  Gen: NAD  LLE:   Dressing c/d/i  +HMV  +ehl/fhl/ta/gs function  L2-S1 silt  Dp/pt pulse intact  No calf ttp  Compartments soft    A/P: 72y Female sp L TKA  Pain control  DVT ppx  PT/WBAT/OOB  FU labs  Ice/elevate  Medical management appreciated  Incentive spirometry  Dispo planning

## 2017-09-14 LAB — SURGICAL PATHOLOGY STUDY: SIGNIFICANT CHANGE UP

## 2017-09-15 ENCOUNTER — MOBILE ON CALL (OUTPATIENT)
Age: 72
End: 2017-09-15

## 2017-09-26 ENCOUNTER — APPOINTMENT (OUTPATIENT)
Dept: ORTHOPEDIC SURGERY | Facility: CLINIC | Age: 72
End: 2017-09-26
Payer: COMMERCIAL

## 2017-09-26 PROCEDURE — 99024 POSTOP FOLLOW-UP VISIT: CPT

## 2017-09-26 PROCEDURE — 73562 X-RAY EXAM OF KNEE 3: CPT | Mod: LT

## 2017-09-26 RX ORDER — VALSARTAN 80 MG/1
80 TABLET, COATED ORAL
Qty: 90 | Refills: 0 | Status: ACTIVE | COMMUNITY
Start: 2017-07-01

## 2017-09-26 RX ORDER — ATORVASTATIN CALCIUM 20 MG/1
20 TABLET, FILM COATED ORAL
Qty: 90 | Refills: 0 | Status: ACTIVE | COMMUNITY
Start: 2017-03-24

## 2017-09-26 RX ORDER — ASPIRIN 325 MG
325 TABLET ORAL
Qty: 80 | Refills: 0 | Status: ACTIVE | COMMUNITY
Start: 2017-09-13

## 2017-09-26 RX ORDER — AMPICILLIN 500 MG/1
500 CAPSULE ORAL
Qty: 21 | Refills: 0 | Status: ACTIVE | COMMUNITY
Start: 2017-05-19

## 2017-09-26 RX ORDER — SULFAMETHOXAZOLE AND TRIMETHOPRIM 800; 160 MG/1; MG/1
800-160 TABLET ORAL
Qty: 14 | Refills: 0 | Status: ACTIVE | COMMUNITY
Start: 2017-06-07

## 2017-09-26 RX ORDER — OXYCODONE 5 MG/1
5 TABLET ORAL
Qty: 42 | Refills: 0 | Status: ACTIVE | COMMUNITY
Start: 2017-09-13

## 2017-11-07 ENCOUNTER — APPOINTMENT (OUTPATIENT)
Dept: ORTHOPEDIC SURGERY | Facility: CLINIC | Age: 72
End: 2017-11-07
Payer: COMMERCIAL

## 2017-11-07 VITALS — BODY MASS INDEX: 36.32 KG/M2 | WEIGHT: 218 LBS | HEIGHT: 65 IN

## 2017-11-07 DIAGNOSIS — M17.12 UNILATERAL PRIMARY OSTEOARTHRITIS, LEFT KNEE: ICD-10-CM

## 2017-11-07 PROCEDURE — 99024 POSTOP FOLLOW-UP VISIT: CPT

## 2017-11-16 ENCOUNTER — APPOINTMENT (OUTPATIENT)
Dept: MAMMOGRAPHY | Facility: IMAGING CENTER | Age: 72
End: 2017-11-16
Payer: COMMERCIAL

## 2017-11-16 ENCOUNTER — APPOINTMENT (OUTPATIENT)
Dept: ULTRASOUND IMAGING | Facility: IMAGING CENTER | Age: 72
End: 2017-11-16
Payer: COMMERCIAL

## 2017-11-16 ENCOUNTER — OUTPATIENT (OUTPATIENT)
Dept: OUTPATIENT SERVICES | Facility: HOSPITAL | Age: 72
LOS: 1 days | End: 2017-11-16
Payer: COMMERCIAL

## 2017-11-16 DIAGNOSIS — Z00.8 ENCOUNTER FOR OTHER GENERAL EXAMINATION: ICD-10-CM

## 2017-11-16 DIAGNOSIS — Z90.49 ACQUIRED ABSENCE OF OTHER SPECIFIED PARTS OF DIGESTIVE TRACT: Chronic | ICD-10-CM

## 2017-11-16 DIAGNOSIS — Z98.890 OTHER SPECIFIED POSTPROCEDURAL STATES: Chronic | ICD-10-CM

## 2017-11-16 PROCEDURE — G0202: CPT | Mod: 26

## 2017-11-16 PROCEDURE — 77067 SCR MAMMO BI INCL CAD: CPT

## 2017-11-16 PROCEDURE — 76641 ULTRASOUND BREAST COMPLETE: CPT | Mod: 26,50

## 2017-11-16 PROCEDURE — 77063 BREAST TOMOSYNTHESIS BI: CPT | Mod: 26

## 2017-11-16 PROCEDURE — 77063 BREAST TOMOSYNTHESIS BI: CPT

## 2017-11-16 PROCEDURE — 76641 ULTRASOUND BREAST COMPLETE: CPT

## 2018-02-06 ENCOUNTER — APPOINTMENT (OUTPATIENT)
Dept: ORTHOPEDIC SURGERY | Facility: CLINIC | Age: 73
End: 2018-02-06
Payer: COMMERCIAL

## 2018-02-06 DIAGNOSIS — M11.261 OTHER CHONDROCALCINOSIS, RIGHT KNEE: ICD-10-CM

## 2018-02-06 DIAGNOSIS — Z96.652 PRESENCE OF LEFT ARTIFICIAL KNEE JOINT: ICD-10-CM

## 2018-02-06 PROCEDURE — 99213 OFFICE O/P EST LOW 20 MIN: CPT

## 2018-04-06 ENCOUNTER — RESULT REVIEW (OUTPATIENT)
Age: 73
End: 2018-04-06

## 2018-07-24 PROBLEM — Z78.9 ALCOHOL USE: Status: ACTIVE | Noted: 2017-06-29

## 2018-07-24 PROBLEM — M11.261 CHONDROCALCINOSIS OF KNEE, RIGHT: Status: ACTIVE | Noted: 2018-02-06

## 2019-02-04 PROBLEM — E66.9 OBESITY, UNSPECIFIED: Chronic | Status: ACTIVE | Noted: 2017-08-28

## 2019-02-04 PROBLEM — M19.90 UNSPECIFIED OSTEOARTHRITIS, UNSPECIFIED SITE: Chronic | Status: ACTIVE | Noted: 2017-08-28

## 2019-02-04 PROBLEM — G47.33 OBSTRUCTIVE SLEEP APNEA (ADULT) (PEDIATRIC): Chronic | Status: ACTIVE | Noted: 2017-08-28

## 2019-02-04 PROBLEM — I10 ESSENTIAL (PRIMARY) HYPERTENSION: Chronic | Status: ACTIVE | Noted: 2017-08-28

## 2019-02-22 NOTE — DISCHARGE NOTE ADULT - MEDICATION SUMMARY - MEDICATIONS TO STOP TAKING
Lactation note:  Initial visit. SERGIO states she latched infant in L&D.  SERGIO currently has infant latched to right breast in cradle hold. SERGIO denies pain with latch. Infant is intermittently suckling at the breast. Discussed normal  behaviors and normal course of breastfeeding at 12-24-48-72 hours, and what to expect. Discussed importance of offering breast with feeding cues, or at least every 2-3 hours. Encouraged to continue doing skin to skin. Discussed signs of a good latch, voiding and stooling patterns, feeding cues, stomach size, and importance of establishing milk supply with frequency of feedings.  New Beginning booklet given, and breastfeeding content reviewed.   Plan for tonight is to continue to offer breast first, if not latching well, can hand express colostrum, and refeed to infant.   Encouraged her to continue to work on deep latch, and skin 2 skin, with hand expression.     Information and phone number to the Lactation connection given, and invited to breastfeeding circles.     Encouraged to call for support as needed.   I will STOP taking the medications listed below when I get home from the hospital:  None

## 2019-03-12 ENCOUNTER — APPOINTMENT (OUTPATIENT)
Dept: MAMMOGRAPHY | Facility: IMAGING CENTER | Age: 74
End: 2019-03-12
Payer: MEDICARE

## 2019-03-12 ENCOUNTER — APPOINTMENT (OUTPATIENT)
Dept: ULTRASOUND IMAGING | Facility: IMAGING CENTER | Age: 74
End: 2019-03-12
Payer: MEDICARE

## 2019-03-12 ENCOUNTER — OUTPATIENT (OUTPATIENT)
Dept: OUTPATIENT SERVICES | Facility: HOSPITAL | Age: 74
LOS: 1 days | End: 2019-03-12
Payer: MEDICARE

## 2019-03-12 DIAGNOSIS — Z98.890 OTHER SPECIFIED POSTPROCEDURAL STATES: Chronic | ICD-10-CM

## 2019-03-12 DIAGNOSIS — Z00.8 ENCOUNTER FOR OTHER GENERAL EXAMINATION: ICD-10-CM

## 2019-03-12 DIAGNOSIS — Z90.49 ACQUIRED ABSENCE OF OTHER SPECIFIED PARTS OF DIGESTIVE TRACT: Chronic | ICD-10-CM

## 2019-03-12 PROCEDURE — 77063 BREAST TOMOSYNTHESIS BI: CPT | Mod: 26

## 2019-03-12 PROCEDURE — 76641 ULTRASOUND BREAST COMPLETE: CPT | Mod: 26,50

## 2019-03-12 PROCEDURE — 77063 BREAST TOMOSYNTHESIS BI: CPT

## 2019-03-12 PROCEDURE — 77067 SCR MAMMO BI INCL CAD: CPT

## 2019-03-12 PROCEDURE — 77067 SCR MAMMO BI INCL CAD: CPT | Mod: 26

## 2019-03-12 PROCEDURE — 76641 ULTRASOUND BREAST COMPLETE: CPT

## 2019-05-06 ENCOUNTER — TRANSCRIPTION ENCOUNTER (OUTPATIENT)
Age: 74
End: 2019-05-06

## 2019-06-05 ENCOUNTER — OUTPATIENT (OUTPATIENT)
Dept: OUTPATIENT SERVICES | Facility: HOSPITAL | Age: 74
LOS: 1 days | End: 2019-06-05
Payer: MEDICARE

## 2019-06-05 ENCOUNTER — APPOINTMENT (OUTPATIENT)
Dept: CT IMAGING | Facility: CLINIC | Age: 74
End: 2019-06-05
Payer: MEDICARE

## 2019-06-05 DIAGNOSIS — Z00.8 ENCOUNTER FOR OTHER GENERAL EXAMINATION: ICD-10-CM

## 2019-06-05 DIAGNOSIS — Z90.49 ACQUIRED ABSENCE OF OTHER SPECIFIED PARTS OF DIGESTIVE TRACT: Chronic | ICD-10-CM

## 2019-06-05 DIAGNOSIS — Z98.890 OTHER SPECIFIED POSTPROCEDURAL STATES: Chronic | ICD-10-CM

## 2019-06-05 PROCEDURE — 74176 CT ABD & PELVIS W/O CONTRAST: CPT

## 2019-06-05 PROCEDURE — 74176 CT ABD & PELVIS W/O CONTRAST: CPT | Mod: 26

## 2020-01-17 NOTE — PATIENT PROFILE ADULT. - FUNCTIONAL SCREEN CURRENT LEVEL: COMMUNICATION, MLM
O-T Plasty Text: The defect edges were debeveled with a #15 scalpel blade.  Given the location of the defect, shape of the defect and the proximity to free margins an O-T plasty was deemed most appropriate.  Using a sterile surgical marker, an appropriate O-T plasty was drawn incorporating the defect and placing the expected incisions within the relaxed skin tension lines where possible.    The area thus outlined was incised deep to adipose tissue with a #15 scalpel blade.  The skin margins were undermined to an appropriate distance in all directions utilizing iris scissors. (0) understands/communicates without difficulty

## 2020-03-17 ENCOUNTER — TRANSCRIPTION ENCOUNTER (OUTPATIENT)
Age: 75
End: 2020-03-17

## 2020-09-04 NOTE — PRE-OP CHECKLIST - BLOOD AVAILABLE
OT xavier performed, report to follow    Pt educated on energy conservation/work simplification    Would benefit from OP PT   ABO sent/yes

## 2020-09-10 NOTE — ANESTHESIA FOLLOW-UP NOTE - NSEVALATION_GEN_ALL_CORE
Detail Level: Simple
No apparent complications or complaints reguarding anesthesia care at this time

## 2021-03-26 ENCOUNTER — OUTPATIENT (OUTPATIENT)
Dept: OUTPATIENT SERVICES | Facility: HOSPITAL | Age: 76
LOS: 1 days | End: 2021-03-26
Payer: MEDICARE

## 2021-03-26 ENCOUNTER — APPOINTMENT (OUTPATIENT)
Dept: MAMMOGRAPHY | Facility: IMAGING CENTER | Age: 76
End: 2021-03-26
Payer: MEDICARE

## 2021-03-26 DIAGNOSIS — Z98.890 OTHER SPECIFIED POSTPROCEDURAL STATES: Chronic | ICD-10-CM

## 2021-03-26 DIAGNOSIS — Z90.49 ACQUIRED ABSENCE OF OTHER SPECIFIED PARTS OF DIGESTIVE TRACT: Chronic | ICD-10-CM

## 2021-03-26 DIAGNOSIS — Z00.8 ENCOUNTER FOR OTHER GENERAL EXAMINATION: ICD-10-CM

## 2021-03-26 PROCEDURE — 77067 SCR MAMMO BI INCL CAD: CPT | Mod: 26

## 2021-03-26 PROCEDURE — 77063 BREAST TOMOSYNTHESIS BI: CPT

## 2021-03-26 PROCEDURE — 77067 SCR MAMMO BI INCL CAD: CPT

## 2021-03-26 PROCEDURE — 77063 BREAST TOMOSYNTHESIS BI: CPT | Mod: 26

## 2021-08-27 ENCOUNTER — APPOINTMENT (OUTPATIENT)
Dept: ULTRASOUND IMAGING | Facility: CLINIC | Age: 76
End: 2021-08-27
Payer: MEDICARE

## 2021-08-27 PROCEDURE — 76770 US EXAM ABDO BACK WALL COMP: CPT

## 2023-08-14 ENCOUNTER — APPOINTMENT (RX ONLY)
Dept: URBAN - METROPOLITAN AREA CLINIC 102 | Facility: CLINIC | Age: 78
Setting detail: DERMATOLOGY
End: 2023-08-14

## 2023-08-14 DIAGNOSIS — L81.4 OTHER MELANIN HYPERPIGMENTATION: ICD-10-CM

## 2023-08-14 DIAGNOSIS — Z85.828 PERSONAL HISTORY OF OTHER MALIGNANT NEOPLASM OF SKIN: ICD-10-CM

## 2023-08-14 DIAGNOSIS — L57.0 ACTINIC KERATOSIS: ICD-10-CM

## 2023-08-14 DIAGNOSIS — D22 MELANOCYTIC NEVI: ICD-10-CM

## 2023-08-14 DIAGNOSIS — L82.1 OTHER SEBORRHEIC KERATOSIS: ICD-10-CM

## 2023-08-14 PROBLEM — D22.72 MELANOCYTIC NEVI OF LEFT LOWER LIMB, INCLUDING HIP: Status: ACTIVE | Noted: 2023-08-14

## 2023-08-14 PROBLEM — D22.5 MELANOCYTIC NEVI OF TRUNK: Status: ACTIVE | Noted: 2023-08-14

## 2023-08-14 PROBLEM — D22.61 MELANOCYTIC NEVI OF RIGHT UPPER LIMB, INCLUDING SHOULDER: Status: ACTIVE | Noted: 2023-08-14

## 2023-08-14 PROCEDURE — 17004 DESTROY PREMAL LESIONS 15/>: CPT

## 2023-08-14 PROCEDURE — ? PRESCRIPTION

## 2023-08-14 PROCEDURE — ? LIQUID NITROGEN

## 2023-08-14 PROCEDURE — ? COUNSELING

## 2023-08-14 PROCEDURE — 99203 OFFICE O/P NEW LOW 30 MIN: CPT | Mod: 25

## 2023-08-14 RX ORDER — FLUOROURACIL 5 MG/G
CREAM TOPICAL QHS
Qty: 40 | Refills: 0 | Status: ERX | COMMUNITY
Start: 2023-08-14

## 2023-08-14 RX ADMIN — FLUOROURACIL: 5 CREAM TOPICAL at 00:00

## 2023-08-14 ASSESSMENT — LOCATION SIMPLE DESCRIPTION DERM
LOCATION SIMPLE: LEFT ANTERIOR NECK
LOCATION SIMPLE: RIGHT UPPER BACK
LOCATION SIMPLE: LEFT FOREHEAD
LOCATION SIMPLE: NOSE
LOCATION SIMPLE: RIGHT ELBOW
LOCATION SIMPLE: LEFT TEMPLE
LOCATION SIMPLE: LEFT FOREARM
LOCATION SIMPLE: CHEST
LOCATION SIMPLE: RIGHT FOREARM
LOCATION SIMPLE: ABDOMEN
LOCATION SIMPLE: RIGHT FOREHEAD
LOCATION SIMPLE: LEFT POSTERIOR THIGH
LOCATION SIMPLE: LEFT UPPER ARM
LOCATION SIMPLE: LEFT CHEEK
LOCATION SIMPLE: RIGHT SHOULDER
LOCATION SIMPLE: RIGHT THIGH
LOCATION SIMPLE: RIGHT TEMPLE
LOCATION SIMPLE: RIGHT EYEBROW
LOCATION SIMPLE: RIGHT PRETIBIAL REGION
LOCATION SIMPLE: LEFT UPPER BACK
LOCATION SIMPLE: LEFT THIGH
LOCATION SIMPLE: RIGHT CHEEK

## 2023-08-14 ASSESSMENT — LOCATION DETAILED DESCRIPTION DERM
LOCATION DETAILED: RIGHT FOREHEAD
LOCATION DETAILED: LEFT ANTERIOR DISTAL THIGH
LOCATION DETAILED: UPPER STERNUM
LOCATION DETAILED: LEFT LATERAL SUPERIOR CHEST
LOCATION DETAILED: LEFT ANTERIOR PROXIMAL UPPER ARM
LOCATION DETAILED: RIGHT LATERAL SUPERIOR CHEST
LOCATION DETAILED: RIGHT PROXIMAL DORSAL FOREARM
LOCATION DETAILED: LEFT SUPERIOR ANTERIOR NECK
LOCATION DETAILED: LEFT INFERIOR CENTRAL MALAR CHEEK
LOCATION DETAILED: RIGHT INFERIOR MEDIAL MALAR CHEEK
LOCATION DETAILED: LEFT MID-UPPER BACK
LOCATION DETAILED: RIGHT ANTERIOR SHOULDER
LOCATION DETAILED: RIGHT CENTRAL MALAR CHEEK
LOCATION DETAILED: LEFT MID TEMPLE
LOCATION DETAILED: LEFT DISTAL POSTERIOR THIGH
LOCATION DETAILED: LEFT SUPERIOR LATERAL BUCCAL CHEEK
LOCATION DETAILED: LEFT LATERAL MALAR CHEEK
LOCATION DETAILED: RIGHT PROXIMAL PRETIBIAL REGION
LOCATION DETAILED: RIGHT MID TEMPLE
LOCATION DETAILED: RIGHT LATERAL EYEBROW
LOCATION DETAILED: NASAL DORSUM
LOCATION DETAILED: LEFT FOREHEAD
LOCATION DETAILED: RIGHT INFERIOR UPPER BACK
LOCATION DETAILED: RIGHT MEDIAL FOREHEAD
LOCATION DETAILED: EPIGASTRIC SKIN
LOCATION DETAILED: LEFT PROXIMAL DORSAL FOREARM
LOCATION DETAILED: RIGHT VENTRAL PROXIMAL FOREARM
LOCATION DETAILED: RIGHT ANTERIOR PROXIMAL THIGH
LOCATION DETAILED: RIGHT SUPERIOR MEDIAL UPPER BACK
LOCATION DETAILED: LEFT INFERIOR MEDIAL FOREHEAD
LOCATION DETAILED: RIGHT SUPERIOR MEDIAL BUCCAL CHEEK
LOCATION DETAILED: RIGHT MEDIAL MALAR CHEEK
LOCATION DETAILED: RIGHT ELBOW

## 2023-08-14 ASSESSMENT — LOCATION ZONE DERM
LOCATION ZONE: FACE
LOCATION ZONE: LEG
LOCATION ZONE: ARM
LOCATION ZONE: NECK
LOCATION ZONE: NOSE
LOCATION ZONE: TRUNK

## 2023-09-18 ENCOUNTER — APPOINTMENT (RX ONLY)
Dept: URBAN - METROPOLITAN AREA CLINIC 102 | Facility: CLINIC | Age: 78
Setting detail: DERMATOLOGY
End: 2023-09-18

## 2023-09-18 DIAGNOSIS — L57.8 OTHER SKIN CHANGES DUE TO CHRONIC EXPOSURE TO NONIONIZING RADIATION: ICD-10-CM

## 2023-09-18 PROCEDURE — ? COUNSELING

## 2023-09-18 PROCEDURE — ? PRESCRIPTION MEDICATION MANAGEMENT

## 2023-09-18 PROCEDURE — 99213 OFFICE O/P EST LOW 20 MIN: CPT

## 2023-09-18 ASSESSMENT — LOCATION SIMPLE DESCRIPTION DERM: LOCATION SIMPLE: RIGHT CHEEK

## 2023-09-18 ASSESSMENT — LOCATION ZONE DERM: LOCATION ZONE: FACE

## 2023-09-18 ASSESSMENT — LOCATION DETAILED DESCRIPTION DERM: LOCATION DETAILED: RIGHT CENTRAL MALAR CHEEK

## 2023-09-18 NOTE — PROCEDURE: PRESCRIPTION MEDICATION MANAGEMENT
Detail Level: Zone
Initiate Treatment: Fluorouracil 5% to face once a day. 1 week on forehead, 1 week on right cheek, 1 week on left cheek, 1 week on chin.
Render In Strict Bullet Format?: No

## 2023-11-14 NOTE — OCCUPATIONAL THERAPY INITIAL EVALUATION ADULT - IMPAIRMENTS CONTRIBUTING IMPAIRED BED MOBILITY, REHAB EVAL
Airway  Date/Time: 11/14/2023 3:09 PM  Urgency: elective      General Information and Staff    Patient location during procedure: OR  Anesthesiologist: Lorenza Archibald MD  Performed: anesthesiologist   Performed by: Lorenza Archibald MD  Authorized by: Lorenza Archibald MD      Indications and Patient Condition  Indications for airway management: anesthesia  Spontaneous Ventilation: absent  Sedation level: deep  Preoxygenated: yes  Patient position: sniffing  Mask difficulty assessment: 0 - not attempted    Final Airway Details  Final airway type: supraglottic airway      Successful airway: classic  Size 3       Number of attempts at approach: 1    Additional Comments  Smooth, atraumatic placement, x1.  Well-seated, +etco2, prompt resumption of SV
decreased ROM/decreased strength/impaired balance

## 2023-11-20 ENCOUNTER — APPOINTMENT (RX ONLY)
Dept: URBAN - METROPOLITAN AREA CLINIC 102 | Facility: CLINIC | Age: 78
Setting detail: DERMATOLOGY
End: 2023-11-20

## 2023-11-20 DIAGNOSIS — L57.0 ACTINIC KERATOSIS: ICD-10-CM

## 2023-11-20 DIAGNOSIS — L71.8 OTHER ROSACEA: ICD-10-CM

## 2023-11-20 DIAGNOSIS — L57.8 OTHER SKIN CHANGES DUE TO CHRONIC EXPOSURE TO NONIONIZING RADIATION: ICD-10-CM

## 2023-11-20 PROCEDURE — 17000 DESTRUCT PREMALG LESION: CPT

## 2023-11-20 PROCEDURE — ? LIQUID NITROGEN

## 2023-11-20 PROCEDURE — 17003 DESTRUCT PREMALG LES 2-14: CPT

## 2023-11-20 PROCEDURE — 99212 OFFICE O/P EST SF 10 MIN: CPT | Mod: 25

## 2023-11-20 PROCEDURE — ? COUNSELING

## 2023-11-20 ASSESSMENT — LOCATION SIMPLE DESCRIPTION DERM
LOCATION SIMPLE: RIGHT FOREHEAD
LOCATION SIMPLE: GLABELLA
LOCATION SIMPLE: LEFT CHEEK
LOCATION SIMPLE: LEFT LIP
LOCATION SIMPLE: NOSE
LOCATION SIMPLE: LEFT FOREHEAD

## 2023-11-20 ASSESSMENT — LOCATION DETAILED DESCRIPTION DERM
LOCATION DETAILED: RIGHT INFERIOR LATERAL FOREHEAD
LOCATION DETAILED: NASAL DORSUM
LOCATION DETAILED: LEFT FOREHEAD
LOCATION DETAILED: LEFT CENTRAL MALAR CHEEK
LOCATION DETAILED: GLABELLA
LOCATION DETAILED: LEFT INFERIOR MEDIAL MALAR CHEEK
LOCATION DETAILED: LEFT LOWER CUTANEOUS LIP
LOCATION DETAILED: LEFT INFERIOR CENTRAL MALAR CHEEK

## 2023-11-20 ASSESSMENT — LOCATION ZONE DERM
LOCATION ZONE: NOSE
LOCATION ZONE: LIP
LOCATION ZONE: FACE

## 2024-05-20 ENCOUNTER — APPOINTMENT (RX ONLY)
Dept: URBAN - METROPOLITAN AREA CLINIC 102 | Facility: CLINIC | Age: 79
Setting detail: DERMATOLOGY
End: 2024-05-20

## 2024-05-20 DIAGNOSIS — L71.8 OTHER ROSACEA: ICD-10-CM

## 2024-05-20 DIAGNOSIS — L82.1 OTHER SEBORRHEIC KERATOSIS: ICD-10-CM

## 2024-05-20 DIAGNOSIS — L57.0 ACTINIC KERATOSIS: ICD-10-CM

## 2024-05-20 DIAGNOSIS — L57.8 OTHER SKIN CHANGES DUE TO CHRONIC EXPOSURE TO NONIONIZING RADIATION: ICD-10-CM | Status: INADEQUATELY CONTROLLED

## 2024-05-20 DIAGNOSIS — D22 MELANOCYTIC NEVI: ICD-10-CM

## 2024-05-20 DIAGNOSIS — D18.0 HEMANGIOMA: ICD-10-CM

## 2024-05-20 DIAGNOSIS — L81.4 OTHER MELANIN HYPERPIGMENTATION: ICD-10-CM

## 2024-05-20 PROBLEM — D22.5 MELANOCYTIC NEVI OF TRUNK: Status: ACTIVE | Noted: 2024-05-20

## 2024-05-20 PROBLEM — D18.01 HEMANGIOMA OF SKIN AND SUBCUTANEOUS TISSUE: Status: ACTIVE | Noted: 2024-05-20

## 2024-05-20 PROCEDURE — 17000 DESTRUCT PREMALG LESION: CPT

## 2024-05-20 PROCEDURE — ? COUNSELING

## 2024-05-20 PROCEDURE — 17003 DESTRUCT PREMALG LES 2-14: CPT

## 2024-05-20 PROCEDURE — 99214 OFFICE O/P EST MOD 30 MIN: CPT | Mod: 25

## 2024-05-20 PROCEDURE — ? LIQUID NITROGEN

## 2024-05-20 PROCEDURE — ? PRESCRIPTION

## 2024-05-20 RX ORDER — DICLOFENAC SODIUM 30 MG/G
GEL TOPICAL QHS
Qty: 100 | Refills: 1 | Status: ERX | COMMUNITY
Start: 2024-05-20

## 2024-05-20 RX ADMIN — DICLOFENAC SODIUM: 30 GEL TOPICAL at 00:00

## 2024-05-20 ASSESSMENT — LOCATION ZONE DERM
LOCATION ZONE: FACE
LOCATION ZONE: NOSE
LOCATION ZONE: TRUNK

## 2024-05-20 ASSESSMENT — LOCATION DETAILED DESCRIPTION DERM
LOCATION DETAILED: LEFT SUPERIOR FOREHEAD
LOCATION DETAILED: PERIUMBILICAL SKIN
LOCATION DETAILED: RIGHT CENTRAL MALAR CHEEK
LOCATION DETAILED: NASAL SUPRATIP
LOCATION DETAILED: RIGHT MEDIAL MALAR CHEEK
LOCATION DETAILED: LEFT MEDIAL MALAR CHEEK
LOCATION DETAILED: NASAL DORSUM
LOCATION DETAILED: EPIGASTRIC SKIN
LOCATION DETAILED: LEFT CENTRAL MALAR CHEEK
LOCATION DETAILED: RIGHT INFERIOR CENTRAL MALAR CHEEK

## 2024-05-20 ASSESSMENT — LOCATION SIMPLE DESCRIPTION DERM
LOCATION SIMPLE: NOSE
LOCATION SIMPLE: LEFT CHEEK
LOCATION SIMPLE: LEFT FOREHEAD
LOCATION SIMPLE: RIGHT CHEEK
LOCATION SIMPLE: ABDOMEN

## 2024-05-20 NOTE — PROCEDURE: LIQUID NITROGEN
Show Applicator Variable?: Yes
Detail Level: Detailed
Render Note In Bullet Format When Appropriate: No
Consent: The patient's consent was obtained including but not limited to risks of crusting, scabbing, blistering, scarring, darker or lighter pigmentary change, recurrence, incomplete removal and infection.
Number Of Freeze-Thaw Cycles: 1 freeze-thaw cycle
Duration Of Freeze Thaw-Cycle (Seconds): 2
Post-Care Instructions: I reviewed with the patient in detail post-care instructions. Patient is to wear sunprotection, and avoid picking at any of the treated lesions. Pt may apply Vaseline to crusted or scabbing areas.

## 2024-05-20 NOTE — PROCEDURE: MIPS QUALITY
Detail Level: Detailed
Quality 226: Preventive Care And Screening: Tobacco Use: Screening And Cessation Intervention: Patient screened for tobacco use and is an ex/non-smoker
Quality 130: Documentation Of Current Medications In The Medical Record: Current Medications Documented
Name And Contact Information For Health Care Proxy: James Connor  [phone]
Quality 47: Advance Care Plan: Advance Care Planning discussed and documented; advance care plan or surrogate decision maker documented in the medical record.

## 2024-11-18 ENCOUNTER — APPOINTMENT (RX ONLY)
Dept: URBAN - METROPOLITAN AREA CLINIC 102 | Facility: CLINIC | Age: 79
Setting detail: DERMATOLOGY
End: 2024-11-18

## 2024-11-18 DIAGNOSIS — L82.1 OTHER SEBORRHEIC KERATOSIS: ICD-10-CM

## 2024-11-18 DIAGNOSIS — L65.9 NONSCARRING HAIR LOSS, UNSPECIFIED: ICD-10-CM

## 2024-11-18 DIAGNOSIS — L85.3 XEROSIS CUTIS: ICD-10-CM

## 2024-11-18 DIAGNOSIS — Z85.828 PERSONAL HISTORY OF OTHER MALIGNANT NEOPLASM OF SKIN: ICD-10-CM | Status: RESOLVED

## 2024-11-18 DIAGNOSIS — D18.0 HEMANGIOMA: ICD-10-CM

## 2024-11-18 DIAGNOSIS — L81.4 OTHER MELANIN HYPERPIGMENTATION: ICD-10-CM

## 2024-11-18 DIAGNOSIS — L57.0 ACTINIC KERATOSIS: ICD-10-CM

## 2024-11-18 PROBLEM — D18.01 HEMANGIOMA OF SKIN AND SUBCUTANEOUS TISSUE: Status: ACTIVE | Noted: 2024-11-18

## 2024-11-18 PROCEDURE — ? SUNSCREEN RECOMMENDATIONS

## 2024-11-18 PROCEDURE — ? PRESCRIPTION

## 2024-11-18 PROCEDURE — 17000 DESTRUCT PREMALG LESION: CPT

## 2024-11-18 PROCEDURE — ? COUNSELING

## 2024-11-18 PROCEDURE — 99213 OFFICE O/P EST LOW 20 MIN: CPT | Mod: 25

## 2024-11-18 PROCEDURE — ? LIQUID NITROGEN

## 2024-11-18 PROCEDURE — ? PRESCRIPTION MEDICATION MANAGEMENT

## 2024-11-18 PROCEDURE — 17003 DESTRUCT PREMALG LES 2-14: CPT

## 2024-11-18 RX ORDER — FINASTERIDE 5 MG/1
TABLET, FILM COATED ORAL
Qty: 90 | Refills: 3 | Status: ERX | COMMUNITY
Start: 2024-11-18

## 2024-11-18 RX ORDER — MINOXIDIL 2.5 MG/1
TABLET ORAL
Qty: 90 | Refills: 5 | Status: ERX | COMMUNITY
Start: 2024-11-18

## 2024-11-18 RX ADMIN — FINASTERIDE: 5 TABLET, FILM COATED ORAL at 00:00

## 2024-11-18 RX ADMIN — MINOXIDIL: 2.5 TABLET ORAL at 00:00

## 2024-11-18 ASSESSMENT — LOCATION DETAILED DESCRIPTION DERM
LOCATION DETAILED: PERIUMBILICAL SKIN
LOCATION DETAILED: LEFT PROXIMAL POSTERIOR UPPER ARM
LOCATION DETAILED: LEFT MEDIAL UPPER BACK
LOCATION DETAILED: RIGHT MID-UPPER BACK
LOCATION DETAILED: POSTERIOR MID-PARIETAL SCALP
LOCATION DETAILED: LEFT ANTERIOR DISTAL THIGH
LOCATION DETAILED: RIGHT INFERIOR MEDIAL MIDBACK
LOCATION DETAILED: RIGHT DISTAL PRETIBIAL REGION
LOCATION DETAILED: EPIGASTRIC SKIN
LOCATION DETAILED: LEFT CENTRAL MALAR CHEEK
LOCATION DETAILED: RIGHT PROXIMAL DORSAL FOREARM
LOCATION DETAILED: RIGHT PROXIMAL PRETIBIAL REGION
LOCATION DETAILED: NASAL DORSUM
LOCATION DETAILED: RIGHT SUPERIOR UPPER BACK
LOCATION DETAILED: RIGHT CENTRAL MALAR CHEEK

## 2024-11-18 ASSESSMENT — LOCATION ZONE DERM
LOCATION ZONE: SCALP
LOCATION ZONE: TRUNK
LOCATION ZONE: FACE
LOCATION ZONE: ARM
LOCATION ZONE: NOSE
LOCATION ZONE: LEG

## 2024-11-18 ASSESSMENT — LOCATION SIMPLE DESCRIPTION DERM
LOCATION SIMPLE: LEFT CHEEK
LOCATION SIMPLE: LEFT UPPER ARM
LOCATION SIMPLE: LEFT UPPER BACK
LOCATION SIMPLE: RIGHT LOWER BACK
LOCATION SIMPLE: RIGHT UPPER BACK
LOCATION SIMPLE: RIGHT PRETIBIAL REGION
LOCATION SIMPLE: RIGHT FOREARM
LOCATION SIMPLE: POSTERIOR SCALP
LOCATION SIMPLE: RIGHT CHEEK
LOCATION SIMPLE: LEFT THIGH
LOCATION SIMPLE: NOSE
LOCATION SIMPLE: ABDOMEN

## 2024-11-18 ASSESSMENT — SEVERITY ASSESSMENT OVERALL AMONG ALL PATIENTS
IN YOUR EXPERIENCE, AMONG ALL PATIENTS YOU HAVE SEEN WITH THIS CONDITION, HOW SEVERE IS THIS PATIENT'S CONDITION?: S1 (LESS THAN 25% HAIR LOSS)

## 2024-11-18 NOTE — HPI: FULL BODY SKIN EXAMINATION
What Type Of Note Output Would You Prefer (Optional)?: Standard Output
What Is The Reason For Today's Visit?: Full Body Skin Examination
DC instructions
What Is The Reason For Today's Visit? (Being Monitored For X): concerning skin lesions on a periodic basis

## 2024-11-18 NOTE — PROCEDURE: PRESCRIPTION MEDICATION MANAGEMENT
Render In Strict Bullet Format?: No
Detail Level: Zone
Initiate Treatment: minoxidil 2.5 mg tablet: Take 1 pill by mouth QD\\n\\nfinasteride 5 mg tablet: Take 1 tablet by mouth daily

## 2024-11-18 NOTE — PROCEDURE: LIQUID NITROGEN
Render Note In Bullet Format When Appropriate: No
Post-Care Instructions: I reviewed with the patient in detail post-care instructions. Patient is to wear sunprotection, and avoid picking at any of the treated lesions. Pt may apply Vaseline to crusted or scabbing areas.
Render Post-Care Instructions In Note?: yes
Consent: The patient's consent was obtained including but not limited to risks of crusting, scabbing, blistering, scarring, darker or lighter pigmentary change, recurrence, incomplete removal and infection.
Duration Of Freeze Thaw-Cycle (Seconds): 5
Application Tool (Optional): Liquid Nitrogen Sprayer
Detail Level: Detailed
Number Of Freeze-Thaw Cycles: 1 freeze-thaw cycle

## 2025-02-24 ENCOUNTER — APPOINTMENT (OUTPATIENT)
Dept: URBAN - METROPOLITAN AREA CLINIC 102 | Facility: CLINIC | Age: 80
Setting detail: DERMATOLOGY
End: 2025-02-24

## 2025-02-24 ENCOUNTER — RX ONLY (RX ONLY)
Age: 80
End: 2025-02-24

## 2025-02-24 DIAGNOSIS — L57.0 ACTINIC KERATOSIS: ICD-10-CM

## 2025-02-24 DIAGNOSIS — L65.9 NONSCARRING HAIR LOSS, UNSPECIFIED: ICD-10-CM

## 2025-02-24 PROCEDURE — ? LIQUID NITROGEN

## 2025-02-24 PROCEDURE — ? PRESCRIPTION MEDICATION MANAGEMENT

## 2025-02-24 PROCEDURE — 17003 DESTRUCT PREMALG LES 2-14: CPT

## 2025-02-24 PROCEDURE — ? COUNSELING

## 2025-02-24 PROCEDURE — 17000 DESTRUCT PREMALG LESION: CPT

## 2025-02-24 PROCEDURE — 99214 OFFICE O/P EST MOD 30 MIN: CPT | Mod: 25

## 2025-02-24 RX ORDER — DICLOFENAC SODIUM 30 MG/G
GEL TOPICAL QHS
Qty: 100 | Refills: 1 | Status: ERX

## 2025-02-24 ASSESSMENT — LOCATION DETAILED DESCRIPTION DERM
LOCATION DETAILED: LEFT CENTRAL MALAR CHEEK
LOCATION DETAILED: LEFT INFERIOR CENTRAL MALAR CHEEK
LOCATION DETAILED: POSTERIOR MID-PARIETAL SCALP
LOCATION DETAILED: NASAL TIP
LOCATION DETAILED: RIGHT INFERIOR CENTRAL MALAR CHEEK
LOCATION DETAILED: RIGHT INFERIOR LATERAL MALAR CHEEK

## 2025-02-24 ASSESSMENT — LOCATION ZONE DERM
LOCATION ZONE: SCALP
LOCATION ZONE: NOSE
LOCATION ZONE: FACE

## 2025-02-24 ASSESSMENT — LOCATION SIMPLE DESCRIPTION DERM
LOCATION SIMPLE: RIGHT CHEEK
LOCATION SIMPLE: LEFT CHEEK
LOCATION SIMPLE: POSTERIOR SCALP
LOCATION SIMPLE: NOSE

## 2025-02-24 NOTE — PROCEDURE: PRESCRIPTION MEDICATION MANAGEMENT
Render In Strict Bullet Format?: No
Detail Level: Zone
Discontinue Regimen: Minoxidil PO, Cardiologist told patient not to take minoxidil PO.
Continue Regimen: finasteride 5 mg tablet: Take 1/2 tablet by mouth daily
Continue Regimen: Diclofenac gel qhs on face.

## 2025-02-27 ENCOUNTER — RX ONLY (RX ONLY)
Age: 80
End: 2025-02-27

## 2025-02-27 RX ORDER — DICLOFENAC SODIUM 30 MG/G
GEL TOPICAL QHS
Qty: 100 | Refills: 1 | Status: ERX

## 2025-06-02 ENCOUNTER — APPOINTMENT (OUTPATIENT)
Dept: URBAN - METROPOLITAN AREA CLINIC 102 | Facility: CLINIC | Age: 80
Setting detail: DERMATOLOGY
End: 2025-06-02

## 2025-06-02 ENCOUNTER — RX ONLY (RX ONLY)
Age: 80
End: 2025-06-02

## 2025-06-02 DIAGNOSIS — L57.8 OTHER SKIN CHANGES DUE TO CHRONIC EXPOSURE TO NONIONIZING RADIATION: ICD-10-CM

## 2025-06-02 DIAGNOSIS — L81.4 OTHER MELANIN HYPERPIGMENTATION: ICD-10-CM

## 2025-06-02 DIAGNOSIS — L82.1 OTHER SEBORRHEIC KERATOSIS: ICD-10-CM

## 2025-06-02 DIAGNOSIS — L65.9 NONSCARRING HAIR LOSS, UNSPECIFIED: ICD-10-CM

## 2025-06-02 DIAGNOSIS — L85.3 XEROSIS CUTIS: ICD-10-CM

## 2025-06-02 DIAGNOSIS — L57.0 ACTINIC KERATOSIS: ICD-10-CM

## 2025-06-02 DIAGNOSIS — Z85.828 PERSONAL HISTORY OF OTHER MALIGNANT NEOPLASM OF SKIN: ICD-10-CM

## 2025-06-02 DIAGNOSIS — D22 MELANOCYTIC NEVI: ICD-10-CM

## 2025-06-02 DIAGNOSIS — D18.0 HEMANGIOMA: ICD-10-CM

## 2025-06-02 PROBLEM — D22.61 MELANOCYTIC NEVI OF RIGHT UPPER LIMB, INCLUDING SHOULDER: Status: ACTIVE | Noted: 2025-06-02

## 2025-06-02 PROBLEM — D22.71 MELANOCYTIC NEVI OF RIGHT LOWER LIMB, INCLUDING HIP: Status: ACTIVE | Noted: 2025-06-02

## 2025-06-02 PROBLEM — D18.01 HEMANGIOMA OF SKIN AND SUBCUTANEOUS TISSUE: Status: ACTIVE | Noted: 2025-06-02

## 2025-06-02 PROCEDURE — ?

## 2025-06-02 PROCEDURE — ? LIQUID NITROGEN

## 2025-06-02 PROCEDURE — ? PRESCRIPTION MEDICATION MANAGEMENT

## 2025-06-02 PROCEDURE — ? OTC TREATMENT REGIMEN

## 2025-06-02 PROCEDURE — ?: Mod: 25

## 2025-06-02 PROCEDURE — ? SUNSCREEN RECOMMENDATIONS

## 2025-06-02 PROCEDURE — ? COUNSELING

## 2025-06-02 RX ORDER — MINOXIDIL 2.5 MG/1
TABLET ORAL
Qty: 90 | Refills: 5 | Status: ERX

## 2025-06-02 RX ORDER — FINASTERIDE 5 MG/1
TABLET, FILM COATED ORAL
Qty: 90 | Refills: 3 | Status: ERX

## 2025-06-02 ASSESSMENT — LOCATION DETAILED DESCRIPTION DERM
LOCATION DETAILED: RIGHT POSTERIOR ANKLE
LOCATION DETAILED: LEFT DISTAL POSTERIOR THIGH
LOCATION DETAILED: RIGHT ANTERIOR DISTAL THIGH
LOCATION DETAILED: RIGHT MID-UPPER BACK
LOCATION DETAILED: RIGHT CENTRAL MALAR CHEEK
LOCATION DETAILED: RIGHT SUPERIOR LATERAL UPPER BACK
LOCATION DETAILED: RIGHT SUPERIOR UPPER BACK
LOCATION DETAILED: NASAL SUPRATIP
LOCATION DETAILED: POSTERIOR MID-PARIETAL SCALP
LOCATION DETAILED: RIGHT VENTRAL DISTAL FOREARM
LOCATION DETAILED: LEFT INFERIOR FOREHEAD
LOCATION DETAILED: RIGHT ANTERIOR SHOULDER
LOCATION DETAILED: RIGHT PROXIMAL PRETIBIAL REGION
LOCATION DETAILED: RIGHT CENTRAL ZYGOMA

## 2025-06-02 ASSESSMENT — LOCATION SIMPLE DESCRIPTION DERM
LOCATION SIMPLE: LEFT POSTERIOR THIGH
LOCATION SIMPLE: POSTERIOR SCALP
LOCATION SIMPLE: RIGHT ZYGOMA
LOCATION SIMPLE: RIGHT UPPER BACK
LOCATION SIMPLE: RIGHT BACK
LOCATION SIMPLE: RIGHT SHOULDER
LOCATION SIMPLE: RIGHT ANKLE
LOCATION SIMPLE: LEFT FOREHEAD
LOCATION SIMPLE: RIGHT CHEEK
LOCATION SIMPLE: RIGHT THIGH
LOCATION SIMPLE: RIGHT PRETIBIAL REGION
LOCATION SIMPLE: NOSE
LOCATION SIMPLE: RIGHT FOREARM

## 2025-06-02 ASSESSMENT — LOCATION ZONE DERM
LOCATION ZONE: ARM
LOCATION ZONE: LEG
LOCATION ZONE: SCALP
LOCATION ZONE: TRUNK
LOCATION ZONE: FACE
LOCATION ZONE: NOSE

## 2025-06-02 NOTE — PROCEDURE: PRESCRIPTION MEDICATION MANAGEMENT
Detail Level: Zone
Render In Strict Bullet Format?: No
Plan: Bare 40 and Gold Bond overnight cream in reserve
Initiate Treatment: Minoxidil 2.5 mg and finasteride 5 mg tablet: Take 1/2 tablet by mouth daily
Plan: Orthopedist recommended to start hair loss medications after surgery of right knee because of the anesthesia might have an impact on hair loss.

## 2025-06-02 NOTE — PROCEDURE: SUNSCREEN RECOMMENDATIONS
General Sunscreen Counseling: I recommended a broad spectrum sunscreen with a SPF of 30 or higher.  I explained that SPF 30 sunscreens block approximately 97 percent of the sun's harmful rays.  Sunscreens should be applied at least 15 minutes prior to expected sun ex\\n\\n every 45 minutes to an hour after getting wet or sweating.  One ounce, or the equivalent of a shot glass full of sunscreen, is adequate to protect the skin not covered by a bathing suit. I also recommended a lip balm with a sunscreen as well. Sun protective clothing can be used in lieu of sunscreen but must be worn the entire time you are exposed to the sun's rays.
Products Recommended: Pt was given some specific recommendations\\nElta MD elements\\nDr Jarts every sun day mineral sunscreen \\Garyo baby mineral continuous protection  \\nSun protective clothing
Detail Level: Detailed